# Patient Record
Sex: FEMALE | Race: WHITE | NOT HISPANIC OR LATINO | URBAN - METROPOLITAN AREA
[De-identification: names, ages, dates, MRNs, and addresses within clinical notes are randomized per-mention and may not be internally consistent; named-entity substitution may affect disease eponyms.]

---

## 2020-07-14 ENCOUNTER — OFFICE VISIT (OUTPATIENT)
Dept: OBSTETRICS AND GYNECOLOGY | Facility: CLINIC | Age: 40
End: 2020-07-14
Payer: COMMERCIAL

## 2020-07-14 VITALS
HEIGHT: 67 IN | SYSTOLIC BLOOD PRESSURE: 122 MMHG | DIASTOLIC BLOOD PRESSURE: 84 MMHG | WEIGHT: 215.81 LBS | BODY MASS INDEX: 33.87 KG/M2

## 2020-07-14 DIAGNOSIS — Z00.00 ANNUAL PHYSICAL EXAM: Primary | ICD-10-CM

## 2020-07-14 PROCEDURE — 99385 PREV VISIT NEW AGE 18-39: CPT | Mod: S$GLB,,, | Performed by: OBSTETRICS & GYNECOLOGY

## 2020-07-14 PROCEDURE — 99385 PR PREVENTIVE VISIT,NEW,18-39: ICD-10-PCS | Mod: S$GLB,,, | Performed by: OBSTETRICS & GYNECOLOGY

## 2020-07-14 NOTE — PROGRESS NOTES
Subjective:       Patient ID: Blank Waldrop is a 40 y.o. female.    Chief Complaint:  Well Woman      History of Present Illness  H/o IVF  No contraception    Annual Exam-Premenopausal  Patient presents for annual exam. The patient has no complaints today. The patient is sexually active.  The patient wears seatbelts: yes. The patient participates in regular exercise: yes. Has the patient ever been transfused or tattooed?: no. The patient reports that there is not domestic violence in her life.    Has an area on left breast that has discoloration. Patient with a h/o psoriasis.    GYN & OB History  Patient's last menstrual period was 2020.   Date of Last Pap: No result found    OB History    Para Term  AB Living   1         1   SAB TAB Ectopic Multiple Live Births                  # Outcome Date GA Lbr Miguel/2nd Weight Sex Delivery Anes PTL Lv   1                 Review of Systems  Review of Systems   Constitutional: Negative for activity change, appetite change, fatigue, fever and unexpected weight change.   HENT: Negative for nasal congestion and tinnitus.    Eyes: Negative for visual disturbance.   Respiratory: Negative for cough and shortness of breath.    Cardiovascular: Negative for chest pain and leg swelling.   Gastrointestinal: Negative for abdominal pain, bloating, blood in stool, constipation and diarrhea.   Genitourinary: Negative for bladder incontinence, decreased libido, dysmenorrhea, dyspareunia, dysuria, vaginal discharge, vaginal pain, vaginal dryness and vaginal odor.   Musculoskeletal: Negative for arthralgias, back pain and joint swelling.   Integumentary:  Negative for acne.   Neurological: Negative for headaches.   Psychiatric/Behavioral: Negative for depression and sleep disturbance. The patient is not nervous/anxious.            Objective:    Physical Exam:   Constitutional: She is oriented to person, place, and time. She appears well-developed and  well-nourished. She is cooperative.      Neck: No thyroid mass and no thyromegaly present.    Cardiovascular: Normal rate and normal pulses.     Pulmonary/Chest: Effort normal. No respiratory distress. Chest wall is not dull to percussion. She exhibits no mass, no bony tenderness, no laceration, no crepitus, no edema, no deformity, no swelling and no retraction. Right breast exhibits no inverted nipple, no mass, no nipple discharge, no skin change, no tenderness, presence, no bleeding and no swelling. Left breast exhibits no inverted nipple, no mass, no nipple discharge, no skin change, no tenderness, presence, no bleeding and no swelling.        Abdominal: Soft. Normal appearance. She exhibits no distension. There is no abdominal tenderness. No hernia.     Genitourinary:    Vagina, uterus and rectum normal.      Pelvic exam was performed with patient supine.   There is no rash, tenderness, lesion or injury on the right labia. There is no rash, tenderness, lesion or injury on the left labia. Cervix is normal. Right adnexum displays no mass, no tenderness and no fullness. Left adnexum displays no mass, no tenderness and no fullness. No rectocele, cystocele or unspecified prolapse of vaginal walls in the vagina. Labial bartholins normal.negative for vaginal discharge          Musculoskeletal: Moves all extremeties.       Neurological: She is alert and oriented to person, place, and time.    Skin: Skin is warm and dry. No rash noted.    Psychiatric: She has a normal mood and affect. Her speech is normal and behavior is normal. Judgment and thought content normal.          Assessment:     Well Visit        Plan:      Annual Exam

## 2020-07-21 ENCOUNTER — TELEPHONE (OUTPATIENT)
Dept: OBSTETRICS AND GYNECOLOGY | Facility: CLINIC | Age: 40
End: 2020-07-21

## 2020-07-21 NOTE — TELEPHONE ENCOUNTER
----- Message from Marcy Trejo NP sent at 7/20/2020  2:03 PM CDT -----  Regarding: Pap results  Please call patient within 24-48 hrs and let them know their pap smear results were normal. Thank you!    -Marcy Trejo NP

## 2020-08-03 ENCOUNTER — PATIENT MESSAGE (OUTPATIENT)
Dept: OBSTETRICS AND GYNECOLOGY | Facility: CLINIC | Age: 40
End: 2020-08-03

## 2020-08-03 ENCOUNTER — TELEPHONE (OUTPATIENT)
Dept: OBSTETRICS AND GYNECOLOGY | Facility: CLINIC | Age: 40
End: 2020-08-03

## 2020-08-03 DIAGNOSIS — E78.00 HYPERCHOLESTEROLEMIA: Primary | ICD-10-CM

## 2020-08-03 NOTE — TELEPHONE ENCOUNTER
----- Message from Ginette Santana MD sent at 8/3/2020 10:14 AM CDT -----  Please send results to Dr Ferguson and leave message with his nurse asking to communicate further management of elevated triglycerides with patient.

## 2022-05-17 ENCOUNTER — OFFICE VISIT (OUTPATIENT)
Dept: OBSTETRICS AND GYNECOLOGY | Facility: CLINIC | Age: 42
End: 2022-05-17
Payer: COMMERCIAL

## 2022-05-17 VITALS
BODY MASS INDEX: 28.41 KG/M2 | HEIGHT: 67 IN | DIASTOLIC BLOOD PRESSURE: 87 MMHG | WEIGHT: 181 LBS | SYSTOLIC BLOOD PRESSURE: 134 MMHG | HEART RATE: 72 BPM

## 2022-05-17 DIAGNOSIS — Z01.419 ROUTINE GYNECOLOGICAL EXAMINATION: Primary | ICD-10-CM

## 2022-05-17 PROCEDURE — 3079F DIAST BP 80-89 MM HG: CPT | Mod: CPTII,S$GLB,, | Performed by: OBSTETRICS & GYNECOLOGY

## 2022-05-17 PROCEDURE — 99395 PREV VISIT EST AGE 18-39: CPT | Mod: S$GLB,,, | Performed by: OBSTETRICS & GYNECOLOGY

## 2022-05-17 PROCEDURE — 3075F SYST BP GE 130 - 139MM HG: CPT | Mod: CPTII,S$GLB,, | Performed by: OBSTETRICS & GYNECOLOGY

## 2022-05-17 PROCEDURE — 3079F PR MOST RECENT DIASTOLIC BLOOD PRESSURE 80-89 MM HG: ICD-10-PCS | Mod: CPTII,S$GLB,, | Performed by: OBSTETRICS & GYNECOLOGY

## 2022-05-17 PROCEDURE — 3008F BODY MASS INDEX DOCD: CPT | Mod: CPTII,S$GLB,, | Performed by: OBSTETRICS & GYNECOLOGY

## 2022-05-17 PROCEDURE — 1159F MED LIST DOCD IN RCRD: CPT | Mod: CPTII,S$GLB,, | Performed by: OBSTETRICS & GYNECOLOGY

## 2022-05-17 PROCEDURE — 3075F PR MOST RECENT SYSTOLIC BLOOD PRESS GE 130-139MM HG: ICD-10-PCS | Mod: CPTII,S$GLB,, | Performed by: OBSTETRICS & GYNECOLOGY

## 2022-05-17 PROCEDURE — 3008F PR BODY MASS INDEX (BMI) DOCUMENTED: ICD-10-PCS | Mod: CPTII,S$GLB,, | Performed by: OBSTETRICS & GYNECOLOGY

## 2022-05-17 PROCEDURE — 1159F PR MEDICATION LIST DOCUMENTED IN MEDICAL RECORD: ICD-10-PCS | Mod: CPTII,S$GLB,, | Performed by: OBSTETRICS & GYNECOLOGY

## 2022-05-17 PROCEDURE — 99395 PR PREVENTIVE VISIT,EST,18-39: ICD-10-PCS | Mod: S$GLB,,, | Performed by: OBSTETRICS & GYNECOLOGY

## 2022-05-17 RX ORDER — APREMILAST 30 MG/1
TABLET, FILM COATED ORAL
COMMUNITY
Start: 2022-05-09

## 2022-05-17 NOTE — PROGRESS NOTES
Subjective:       Patient ID: Blank Waldrop is a 41 y.o. female.    Chief Complaint:  Well Woman      History of Present Illness  HPI  Annual Exam-Premenopausal  Patient presents for annual exam. The patient has no complaints today.     GYN & OB History  Patient's last menstrual period was 2022.   Date of Last Pap: No result found    OB History    Para Term  AB Living   1         1   SAB IAB Ectopic Multiple Live Births                  # Outcome Date GA Lbr Miguel/2nd Weight Sex Delivery Anes PTL Lv   1                 Review of Systems  Review of Systems   Constitutional: Negative for activity change, appetite change, fatigue, fever and unexpected weight change.   HENT: Negative for nasal congestion and tinnitus.    Eyes: Negative for visual disturbance.   Respiratory: Negative for cough and shortness of breath.    Cardiovascular: Negative for chest pain and leg swelling.   Gastrointestinal: Negative for abdominal pain, bloating, blood in stool, constipation and diarrhea.   Genitourinary: Negative for bladder incontinence, decreased libido, dysuria, vaginal bleeding, vaginal discharge, vaginal pain, vaginal dryness and vaginal odor.   Musculoskeletal: Negative for arthralgias, back pain and joint swelling.   Integumentary:  Negative for acne.   Neurological: Negative for headaches.   Psychiatric/Behavioral: Negative for depression and sleep disturbance. The patient is not nervous/anxious.            Objective:    Physical Exam:   Constitutional: She is oriented to person, place, and time. Vital signs are normal. She appears well-developed and well-nourished. She is cooperative.      Neck: No thyroid mass and no thyromegaly present.    Cardiovascular: Normal rate, regular rhythm and normal pulses.     Pulmonary/Chest: Effort normal. No respiratory distress. Chest wall is not dull to percussion. She exhibits no mass, no bony tenderness, no laceration, no crepitus, no edema, no  deformity, no swelling and no retraction. Right breast exhibits no inverted nipple, no mass, no nipple discharge, no skin change, no tenderness, presence, no bleeding and no swelling. Left breast exhibits no inverted nipple, no mass, no nipple discharge, no skin change, no tenderness, presence, no bleeding and no swelling.        Abdominal: Soft. She exhibits no distension. There is no abdominal tenderness. No hernia.     Genitourinary:    Vagina, uterus and rectum normal.      Pelvic exam was performed with patient supine.   Labial bartholins normal.There is no rash, tenderness, lesion or injury on the right labia. There is no rash, tenderness, lesion or injury on the left labia. Cervix is normal. Right adnexum displays no mass, no tenderness and no fullness. Left adnexum displays no mass, no tenderness and no fullness. No  no vaginal discharge, rectocele, cystocele or unspecified prolapse of vaginal walls in the vagina.           Musculoskeletal: Moves all extremeties.       Neurological: She is alert and oriented to person, place, and time.    Skin: Skin is warm and dry. No rash noted.    Psychiatric: She has a normal mood and affect. Her speech is normal and behavior is normal. Judgment and thought content normal.          Assessment:     Well Woman Exam        Plan:      Routine care

## 2023-05-23 ENCOUNTER — OFFICE VISIT (OUTPATIENT)
Dept: OBSTETRICS AND GYNECOLOGY | Facility: CLINIC | Age: 43
End: 2023-05-23
Payer: COMMERCIAL

## 2023-05-23 VITALS
HEIGHT: 67 IN | WEIGHT: 191.13 LBS | SYSTOLIC BLOOD PRESSURE: 108 MMHG | HEART RATE: 61 BPM | BODY MASS INDEX: 30 KG/M2 | DIASTOLIC BLOOD PRESSURE: 70 MMHG

## 2023-05-23 DIAGNOSIS — Z01.419 ROUTINE GYNECOLOGICAL EXAMINATION: Primary | ICD-10-CM

## 2023-05-23 PROCEDURE — 3074F SYST BP LT 130 MM HG: CPT | Mod: CPTII,S$GLB,, | Performed by: OBSTETRICS & GYNECOLOGY

## 2023-05-23 PROCEDURE — 3078F PR MOST RECENT DIASTOLIC BLOOD PRESSURE < 80 MM HG: ICD-10-PCS | Mod: CPTII,S$GLB,, | Performed by: OBSTETRICS & GYNECOLOGY

## 2023-05-23 PROCEDURE — 99396 PREV VISIT EST AGE 40-64: CPT | Mod: S$GLB,,, | Performed by: OBSTETRICS & GYNECOLOGY

## 2023-05-23 PROCEDURE — 3078F DIAST BP <80 MM HG: CPT | Mod: CPTII,S$GLB,, | Performed by: OBSTETRICS & GYNECOLOGY

## 2023-05-23 PROCEDURE — 3074F PR MOST RECENT SYSTOLIC BLOOD PRESSURE < 130 MM HG: ICD-10-PCS | Mod: CPTII,S$GLB,, | Performed by: OBSTETRICS & GYNECOLOGY

## 2023-05-23 PROCEDURE — 99396 PR PREVENTIVE VISIT,EST,40-64: ICD-10-PCS | Mod: S$GLB,,, | Performed by: OBSTETRICS & GYNECOLOGY

## 2023-05-23 PROCEDURE — 3008F PR BODY MASS INDEX (BMI) DOCUMENTED: ICD-10-PCS | Mod: CPTII,S$GLB,, | Performed by: OBSTETRICS & GYNECOLOGY

## 2023-05-23 PROCEDURE — 3008F BODY MASS INDEX DOCD: CPT | Mod: CPTII,S$GLB,, | Performed by: OBSTETRICS & GYNECOLOGY

## 2023-05-23 NOTE — PROGRESS NOTES
Subjective:      Patient ID: Blank Waldrop is a 42 y.o. female.    Chief Complaint:  Well Woman (Patient presents in office with no complaints. )      History of Present Illness  HPI  No complaints    GYN & OB History  Patient's last menstrual period was 2023 (exact date).   Date of Last Pap: No result found    OB History    Para Term  AB Living   1         1   SAB IAB Ectopic Multiple Live Births                  # Outcome Date GA Lbr Miguel/2nd Weight Sex Delivery Anes PTL Lv   1                 Review of Systems  Review of Systems   Constitutional:  Negative for activity change, appetite change, fatigue, fever and unexpected weight change.   HENT:  Negative for nasal congestion and tinnitus.    Eyes:  Negative for visual disturbance.   Respiratory:  Negative for cough and shortness of breath.    Cardiovascular:  Negative for chest pain and leg swelling.   Gastrointestinal:  Negative for abdominal pain, bloating, blood in stool, constipation and diarrhea.   Genitourinary:  Negative for bladder incontinence, dysuria, vaginal bleeding, vaginal discharge, vaginal pain, vaginal dryness and vaginal odor.   Musculoskeletal:  Negative for arthralgias, back pain and joint swelling.   Integumentary:  Negative for acne.   Neurological:  Negative for headaches.   Psychiatric/Behavioral:  Negative for depression and sleep disturbance. The patient is not nervous/anxious.         Objective:     Physical Exam:   Constitutional: She is oriented to person, place, and time. Vital signs are normal. She appears well-developed and well-nourished. She is cooperative.      Neck: No thyroid mass and no thyromegaly present.    Cardiovascular:  Normal rate, regular rhythm and normal pulses.             Pulmonary/Chest: Effort normal. No respiratory distress. Chest wall is not dull to percussion. She exhibits no mass, no bony tenderness, no laceration, no crepitus, no edema, no deformity, no swelling and no  retraction. Right breast exhibits no inverted nipple, no mass, no nipple discharge, no skin change, no tenderness, presence, no bleeding and no swelling. Left breast exhibits no inverted nipple, no mass, no nipple discharge, no skin change, no tenderness, presence, no bleeding and no swelling.        Abdominal: Soft. She exhibits no distension. There is no abdominal tenderness. No hernia.     Genitourinary:    Vagina, uterus and rectum normal.      Pelvic exam was performed with patient supine.   Labial bartholins normal.There is no rash, tenderness, lesion or injury on the right labia. There is no rash, tenderness, lesion or injury on the left labia. Cervix is normal. Right adnexum displays no mass, no tenderness and no fullness. Left adnexum displays no mass, no tenderness and no fullness. No  no vaginal discharge, rectocele, cystocele or unspecified prolapse of vaginal walls in the vagina.           Musculoskeletal: Moves all extremeties.       Neurological: She is alert and oriented to person, place, and time.    Skin: Skin is warm and dry. No rash noted.    Psychiatric: She has a normal mood and affect. Her speech is normal and behavior is normal. Judgment and thought content normal.       Assessment:     1. Routine gynecological examination               Plan:     Routine care

## 2023-06-07 ENCOUNTER — TELEPHONE (OUTPATIENT)
Dept: OBSTETRICS AND GYNECOLOGY | Facility: CLINIC | Age: 43
End: 2023-06-07
Payer: COMMERCIAL

## 2023-06-07 NOTE — TELEPHONE ENCOUNTER
Called patient to inform her that additional imaging is needed of the left breast. Patient acknowledged.         Soco

## 2023-06-07 NOTE — TELEPHONE ENCOUNTER
----- Message from Ginette Santana MD sent at 6/7/2023  7:28 AM CDT -----  Radiologist recommends additional imaging. I am not concerned. Please notify patient and order.

## 2023-06-22 ENCOUNTER — TELEPHONE (OUTPATIENT)
Dept: OBSTETRICS AND GYNECOLOGY | Facility: CLINIC | Age: 43
End: 2023-06-22
Payer: COMMERCIAL

## 2023-06-22 NOTE — TELEPHONE ENCOUNTER
Phone call received on back line regarding patient currently at [facility] and radiologist requesting ultrasound for addition views. Verbal order given at this time to preform ultrasound.

## 2023-06-23 ENCOUNTER — PATIENT MESSAGE (OUTPATIENT)
Dept: OBSTETRICS AND GYNECOLOGY | Facility: CLINIC | Age: 43
End: 2023-06-23
Payer: COMMERCIAL

## 2023-06-23 ENCOUNTER — TELEPHONE (OUTPATIENT)
Dept: OBSTETRICS AND GYNECOLOGY | Facility: CLINIC | Age: 43
End: 2023-06-23
Payer: COMMERCIAL

## 2023-06-23 DIAGNOSIS — N63.20 MASS OF LEFT BREAST, UNSPECIFIED QUADRANT: Primary | ICD-10-CM

## 2023-06-30 ENCOUNTER — PATIENT MESSAGE (OUTPATIENT)
Dept: OBSTETRICS AND GYNECOLOGY | Facility: CLINIC | Age: 43
End: 2023-06-30
Payer: COMMERCIAL

## 2023-07-14 ENCOUNTER — PATIENT MESSAGE (OUTPATIENT)
Dept: OBSTETRICS AND GYNECOLOGY | Facility: CLINIC | Age: 43
End: 2023-07-14
Payer: COMMERCIAL

## 2023-07-18 ENCOUNTER — TELEPHONE (OUTPATIENT)
Dept: OBSTETRICS AND GYNECOLOGY | Facility: CLINIC | Age: 43
End: 2023-07-18
Payer: COMMERCIAL

## 2023-07-18 DIAGNOSIS — D24.9 FIBROADENOMA OF BREAST, UNSPECIFIED LATERALITY: Primary | ICD-10-CM

## 2024-05-31 ENCOUNTER — OFFICE VISIT (OUTPATIENT)
Dept: OBSTETRICS AND GYNECOLOGY | Facility: CLINIC | Age: 44
End: 2024-05-31
Payer: COMMERCIAL

## 2024-05-31 VITALS
BODY MASS INDEX: 32.14 KG/M2 | WEIGHT: 205.19 LBS | HEART RATE: 72 BPM | SYSTOLIC BLOOD PRESSURE: 135 MMHG | DIASTOLIC BLOOD PRESSURE: 80 MMHG

## 2024-05-31 DIAGNOSIS — Z01.419 ROUTINE GYNECOLOGICAL EXAMINATION: Primary | ICD-10-CM

## 2024-05-31 DIAGNOSIS — Z12.31 BREAST CANCER SCREENING BY MAMMOGRAM: Primary | ICD-10-CM

## 2024-05-31 DIAGNOSIS — R53.83 FATIGUE, UNSPECIFIED TYPE: ICD-10-CM

## 2024-05-31 DIAGNOSIS — R68.82 DECREASED LIBIDO: ICD-10-CM

## 2024-05-31 DIAGNOSIS — E28.2 PCOS (POLYCYSTIC OVARIAN SYNDROME): ICD-10-CM

## 2024-05-31 PROCEDURE — 3075F SYST BP GE 130 - 139MM HG: CPT | Mod: CPTII,S$GLB,, | Performed by: OBSTETRICS & GYNECOLOGY

## 2024-05-31 PROCEDURE — 3008F BODY MASS INDEX DOCD: CPT | Mod: CPTII,S$GLB,, | Performed by: OBSTETRICS & GYNECOLOGY

## 2024-05-31 PROCEDURE — 99396 PREV VISIT EST AGE 40-64: CPT | Mod: S$GLB,,, | Performed by: OBSTETRICS & GYNECOLOGY

## 2024-05-31 PROCEDURE — 1159F MED LIST DOCD IN RCRD: CPT | Mod: CPTII,S$GLB,, | Performed by: OBSTETRICS & GYNECOLOGY

## 2024-05-31 PROCEDURE — 3079F DIAST BP 80-89 MM HG: CPT | Mod: CPTII,S$GLB,, | Performed by: OBSTETRICS & GYNECOLOGY

## 2024-05-31 RX ORDER — ONDANSETRON 4 MG/1
4 TABLET, FILM COATED ORAL DAILY PRN
Qty: 30 TABLET | Refills: 1 | Status: SHIPPED | OUTPATIENT
Start: 2024-05-31 | End: 2025-05-31

## 2024-05-31 RX ORDER — TIRZEPATIDE 2.5 MG/.5ML
2.5 INJECTION, SOLUTION SUBCUTANEOUS
Qty: 2 ML | Refills: 11 | Status: SHIPPED | OUTPATIENT
Start: 2024-05-31 | End: 2025-05-31

## 2024-05-31 NOTE — PROGRESS NOTES
Subjective     Patient ID: Blank Waldrop is a 43 y.o. female.    Chief Complaint:  Well Woman      History of Present Illness  HPI  Having some stress. Hormones checked by Dr. Ferguson. Feels fatigues allt he time and slowly gaining weight.   FSH 8.56/ Estradiol 24 pg,   No libido  AST slightly elevated  Testosterone 19  Prolactin wnl    GYN & OB History  Patient's last menstrual period was 2024.   Date of Last Pap: No result found    OB History    Para Term  AB Living   1         1   SAB IAB Ectopic Multiple Live Births                  # Outcome Date GA Lbr Miguel/2nd Weight Sex Type Anes PTL Lv   1                 Review of Systems  Review of Systems   Constitutional:  Negative for activity change, appetite change, fatigue, fever and unexpected weight change.   HENT:  Negative for nasal congestion and tinnitus.    Eyes:  Negative for visual disturbance.   Respiratory:  Negative for cough and shortness of breath.    Cardiovascular:  Negative for chest pain and leg swelling.   Gastrointestinal:  Negative for abdominal pain, bloating, blood in stool, constipation and diarrhea.   Genitourinary:  Negative for bladder incontinence, dysuria, vaginal bleeding, vaginal discharge, vaginal pain, vaginal dryness and vaginal odor.   Musculoskeletal:  Negative for arthralgias, back pain and joint swelling.   Integumentary:  Negative for acne.   Neurological:  Negative for headaches.   Psychiatric/Behavioral:  Negative for depression and sleep disturbance. The patient is not nervous/anxious.           Objective   Physical Exam:   Constitutional: She is oriented to person, place, and time. Vital signs are normal. She appears well-developed and well-nourished. She is cooperative.      Neck: No thyroid mass and no thyromegaly present.    Cardiovascular:  Normal rate, regular rhythm and normal pulses.             Pulmonary/Chest: Effort normal. No respiratory distress. Chest wall is not dull to  percussion. She exhibits no mass, no bony tenderness, no laceration, no crepitus, no edema, no deformity, no swelling and no retraction. Right breast exhibits no inverted nipple, no mass, no nipple discharge, no skin change, no tenderness, presence, no bleeding and no swelling. Left breast exhibits no inverted nipple, no mass, no nipple discharge, no skin change, no tenderness, presence, no bleeding and no swelling.        Abdominal: Soft. She exhibits no distension. There is no abdominal tenderness. No hernia.     Genitourinary:    Vagina, uterus and rectum normal.      Pelvic exam was performed with patient supine.     Labial bartholins normal.There is no rash, tenderness, lesion or injury on the right labia. There is no rash, tenderness, lesion or injury on the left labia. Cervix is normal. Right adnexum displays no mass, no tenderness and no fullness. Left adnexum displays no mass, no tenderness and no fullness. No vaginal discharge, rectocele, cystocele or prolapse of vaginal walls in the vagina.           Musculoskeletal: Moves all extremeties.       Neurological: She is alert and oriented to person, place, and time.    Skin: Skin is warm and dry. No rash noted.    Psychiatric: She has a normal mood and affect. Her speech is normal and behavior is normal. Judgment and thought content normal.            Assessment and Plan     1. Routine gynecological examination       Decreased Libido      Plan:  H/o PCOS and infertility.         Discussed Mounjaro  Would avoid testosterone given LFTs on recent labs.

## 2024-06-10 LAB — Lab: NORMAL

## 2024-07-26 ENCOUNTER — DOCUMENTATION ONLY (OUTPATIENT)
Dept: OBSTETRICS AND GYNECOLOGY | Facility: CLINIC | Age: 44
End: 2024-07-26
Payer: COMMERCIAL